# Patient Record
Sex: FEMALE | Race: WHITE | NOT HISPANIC OR LATINO | Employment: FULL TIME | URBAN - METROPOLITAN AREA
[De-identification: names, ages, dates, MRNs, and addresses within clinical notes are randomized per-mention and may not be internally consistent; named-entity substitution may affect disease eponyms.]

---

## 2018-10-17 ENCOUNTER — HOSPITAL ENCOUNTER (OUTPATIENT)
Dept: LAB | Facility: MEDICAL CENTER | Age: 38
End: 2018-10-17
Attending: OBSTETRICS & GYNECOLOGY
Payer: COMMERCIAL

## 2018-10-17 LAB
ERYTHROCYTE [DISTWIDTH] IN BLOOD BY AUTOMATED COUNT: 48.7 FL (ref 35.9–50)
GLUCOSE 1H P 50 G GLC PO SERPL-MCNC: 122 MG/DL (ref 70–139)
HCT VFR BLD AUTO: 31.9 % (ref 37–47)
HGB BLD-MCNC: 10.7 G/DL (ref 12–16)
MCH RBC QN AUTO: 32.5 PG (ref 27–33)
MCHC RBC AUTO-ENTMCNC: 33.5 G/DL (ref 33.6–35)
MCV RBC AUTO: 97 FL (ref 81.4–97.8)
PLATELET # BLD AUTO: 275 K/UL (ref 164–446)
PMV BLD AUTO: 10.3 FL (ref 9–12.9)
RBC # BLD AUTO: 3.29 M/UL (ref 4.2–5.4)
WBC # BLD AUTO: 10.4 K/UL (ref 4.8–10.8)

## 2018-10-17 PROCEDURE — 85027 COMPLETE CBC AUTOMATED: CPT

## 2018-10-17 PROCEDURE — 82950 GLUCOSE TEST: CPT

## 2018-10-17 PROCEDURE — 36415 COLL VENOUS BLD VENIPUNCTURE: CPT

## 2018-12-23 ENCOUNTER — HOSPITAL ENCOUNTER (INPATIENT)
Facility: MEDICAL CENTER | Age: 38
LOS: 1 days | End: 2018-12-24
Attending: OBSTETRICS & GYNECOLOGY | Admitting: OBSTETRICS & GYNECOLOGY
Payer: COMMERCIAL

## 2018-12-23 LAB
ALBUMIN SERPL BCP-MCNC: 3.6 G/DL (ref 3.2–4.9)
ALBUMIN/GLOB SERPL: 1.2 G/DL
ALP SERPL-CCNC: 472 U/L (ref 30–99)
ALT SERPL-CCNC: 13 U/L (ref 2–50)
ANION GAP SERPL CALC-SCNC: 10 MMOL/L (ref 0–11.9)
AST SERPL-CCNC: 16 U/L (ref 12–45)
BASOPHILS # BLD AUTO: 0.3 % (ref 0–1.8)
BASOPHILS # BLD: 0.04 K/UL (ref 0–0.12)
BILIRUB SERPL-MCNC: 0.3 MG/DL (ref 0.1–1.5)
BUN SERPL-MCNC: 9 MG/DL (ref 8–22)
CALCIUM SERPL-MCNC: 9.6 MG/DL (ref 8.5–10.5)
CHLORIDE SERPL-SCNC: 106 MMOL/L (ref 96–112)
CO2 SERPL-SCNC: 20 MMOL/L (ref 20–33)
CREAT SERPL-MCNC: 0.55 MG/DL (ref 0.5–1.4)
CREAT UR-MCNC: 114.4 MG/DL
EOSINOPHIL # BLD AUTO: 0.08 K/UL (ref 0–0.51)
EOSINOPHIL NFR BLD: 0.6 % (ref 0–6.9)
ERYTHROCYTE [DISTWIDTH] IN BLOOD BY AUTOMATED COUNT: 45.3 FL (ref 35.9–50)
ERYTHROCYTE [DISTWIDTH] IN BLOOD BY AUTOMATED COUNT: 47.3 FL (ref 35.9–50)
GLOBULIN SER CALC-MCNC: 3.1 G/DL (ref 1.9–3.5)
GLUCOSE SERPL-MCNC: 91 MG/DL (ref 65–99)
HCT VFR BLD AUTO: 35.4 % (ref 37–47)
HCT VFR BLD AUTO: 38.2 % (ref 37–47)
HGB BLD-MCNC: 11.9 G/DL (ref 12–16)
HGB BLD-MCNC: 13.2 G/DL (ref 12–16)
HOLDING TUBE BB 8507: NORMAL
IMM GRANULOCYTES # BLD AUTO: 0.12 K/UL (ref 0–0.11)
IMM GRANULOCYTES NFR BLD AUTO: 0.9 % (ref 0–0.9)
LYMPHOCYTES # BLD AUTO: 2.24 K/UL (ref 1–4.8)
LYMPHOCYTES NFR BLD: 16.3 % (ref 22–41)
MCH RBC QN AUTO: 32.4 PG (ref 27–33)
MCH RBC QN AUTO: 32.9 PG (ref 27–33)
MCHC RBC AUTO-ENTMCNC: 33.6 G/DL (ref 33.6–35)
MCHC RBC AUTO-ENTMCNC: 34.6 G/DL (ref 33.6–35)
MCV RBC AUTO: 95.3 FL (ref 81.4–97.8)
MCV RBC AUTO: 96.5 FL (ref 81.4–97.8)
MONOCYTES # BLD AUTO: 1.25 K/UL (ref 0–0.85)
MONOCYTES NFR BLD AUTO: 9.1 % (ref 0–13.4)
NEUTROPHILS # BLD AUTO: 10.04 K/UL (ref 2–7.15)
NEUTROPHILS NFR BLD: 72.8 % (ref 44–72)
NRBC # BLD AUTO: 0 K/UL
NRBC BLD-RTO: 0 /100 WBC
PLATELET # BLD AUTO: 219 K/UL (ref 164–446)
PLATELET # BLD AUTO: 263 K/UL (ref 164–446)
PMV BLD AUTO: 11.3 FL (ref 9–12.9)
PMV BLD AUTO: 11.4 FL (ref 9–12.9)
POTASSIUM SERPL-SCNC: 4 MMOL/L (ref 3.6–5.5)
PROT SERPL-MCNC: 6.7 G/DL (ref 6–8.2)
PROT UR-MCNC: 11.7 MG/DL (ref 0–15)
PROT/CREAT UR: 102 MG/G (ref 10–107)
RBC # BLD AUTO: 3.67 M/UL (ref 4.2–5.4)
RBC # BLD AUTO: 4.01 M/UL (ref 4.2–5.4)
SODIUM SERPL-SCNC: 136 MMOL/L (ref 135–145)
WBC # BLD AUTO: 13.8 K/UL (ref 4.8–10.8)
WBC # BLD AUTO: 16.4 K/UL (ref 4.8–10.8)

## 2018-12-23 PROCEDURE — 80053 COMPREHEN METABOLIC PANEL: CPT

## 2018-12-23 PROCEDURE — 82570 ASSAY OF URINE CREATININE: CPT

## 2018-12-23 PROCEDURE — 85027 COMPLETE CBC AUTOMATED: CPT

## 2018-12-23 PROCEDURE — 700111 HCHG RX REV CODE 636 W/ 250 OVERRIDE (IP): Performed by: OBSTETRICS & GYNECOLOGY

## 2018-12-23 PROCEDURE — 700105 HCHG RX REV CODE 258

## 2018-12-23 PROCEDURE — 36415 COLL VENOUS BLD VENIPUNCTURE: CPT

## 2018-12-23 PROCEDURE — 770002 HCHG ROOM/CARE - OB PRIVATE (112)

## 2018-12-23 PROCEDURE — 700105 HCHG RX REV CODE 258: Performed by: OBSTETRICS & GYNECOLOGY

## 2018-12-23 PROCEDURE — 0KQM0ZZ REPAIR PERINEUM MUSCLE, OPEN APPROACH: ICD-10-PCS | Performed by: OBSTETRICS & GYNECOLOGY

## 2018-12-23 PROCEDURE — 700112 HCHG RX REV CODE 229: Performed by: OBSTETRICS & GYNECOLOGY

## 2018-12-23 PROCEDURE — A9270 NON-COVERED ITEM OR SERVICE: HCPCS | Performed by: OBSTETRICS & GYNECOLOGY

## 2018-12-23 PROCEDURE — 700101 HCHG RX REV CODE 250

## 2018-12-23 PROCEDURE — 303615 HCHG EPIDURAL/SPINAL ANESTHESIA FOR LABOR

## 2018-12-23 PROCEDURE — 700105 HCHG RX REV CODE 258: Performed by: ANESTHESIOLOGY

## 2018-12-23 PROCEDURE — 85025 COMPLETE CBC W/AUTO DIFF WBC: CPT

## 2018-12-23 PROCEDURE — 84156 ASSAY OF PROTEIN URINE: CPT

## 2018-12-23 PROCEDURE — 59409 OBSTETRICAL CARE: CPT

## 2018-12-23 PROCEDURE — 700102 HCHG RX REV CODE 250 W/ 637 OVERRIDE(OP): Performed by: OBSTETRICS & GYNECOLOGY

## 2018-12-23 PROCEDURE — 304965 HCHG RECOVERY SERVICES

## 2018-12-23 PROCEDURE — 700111 HCHG RX REV CODE 636 W/ 250 OVERRIDE (IP)

## 2018-12-23 RX ORDER — HYDROCODONE BITARTRATE AND ACETAMINOPHEN 10; 325 MG/1; MG/1
1 TABLET ORAL EVERY 4 HOURS PRN
Status: DISCONTINUED | OUTPATIENT
Start: 2018-12-23 | End: 2018-12-24 | Stop reason: HOSPADM

## 2018-12-23 RX ORDER — SODIUM CHLORIDE, SODIUM LACTATE, POTASSIUM CHLORIDE, CALCIUM CHLORIDE 600; 310; 30; 20 MG/100ML; MG/100ML; MG/100ML; MG/100ML
INJECTION, SOLUTION INTRAVENOUS CONTINUOUS
Status: ACTIVE | OUTPATIENT
Start: 2018-12-23 | End: 2018-12-23

## 2018-12-23 RX ORDER — SODIUM CHLORIDE, SODIUM LACTATE, POTASSIUM CHLORIDE, AND CALCIUM CHLORIDE .6; .31; .03; .02 G/100ML; G/100ML; G/100ML; G/100ML
250 INJECTION, SOLUTION INTRAVENOUS PRN
Status: DISCONTINUED | OUTPATIENT
Start: 2018-12-23 | End: 2018-12-23 | Stop reason: HOSPADM

## 2018-12-23 RX ORDER — MISOPROSTOL 200 UG/1
800 TABLET ORAL
Status: DISCONTINUED | OUTPATIENT
Start: 2018-12-23 | End: 2018-12-24 | Stop reason: HOSPADM

## 2018-12-23 RX ORDER — SODIUM CHLORIDE, SODIUM LACTATE, POTASSIUM CHLORIDE, CALCIUM CHLORIDE 600; 310; 30; 20 MG/100ML; MG/100ML; MG/100ML; MG/100ML
INJECTION, SOLUTION INTRAVENOUS
Status: COMPLETED
Start: 2018-12-23 | End: 2018-12-23

## 2018-12-23 RX ORDER — SODIUM CHLORIDE, SODIUM LACTATE, POTASSIUM CHLORIDE, AND CALCIUM CHLORIDE .6; .31; .03; .02 G/100ML; G/100ML; G/100ML; G/100ML
1000 INJECTION, SOLUTION INTRAVENOUS
Status: COMPLETED | OUTPATIENT
Start: 2018-12-23 | End: 2018-12-23

## 2018-12-23 RX ORDER — ROPIVACAINE HYDROCHLORIDE 2 MG/ML
INJECTION, SOLUTION EPIDURAL; INFILTRATION; PERINEURAL CONTINUOUS
Status: DISCONTINUED | OUTPATIENT
Start: 2018-12-23 | End: 2018-12-24 | Stop reason: HOSPADM

## 2018-12-23 RX ORDER — VITAMIN A ACETATE, BETA CAROTENE, ASCORBIC ACID, CHOLECALCIFEROL, .ALPHA.-TOCOPHEROL ACETATE, DL-, THIAMINE MONONITRATE, RIBOFLAVIN, NIACINAMIDE, PYRIDOXINE HYDROCHLORIDE, FOLIC ACID, CYANOCOBALAMIN, CALCIUM CARBONATE, FERROUS FUMARATE, ZINC OXIDE, CUPRIC OXIDE 3080; 12; 120; 400; 1; 1.84; 3; 20; 22; 920; 25; 200; 27; 10; 2 [IU]/1; UG/1; MG/1; [IU]/1; MG/1; MG/1; MG/1; MG/1; MG/1; [IU]/1; MG/1; MG/1; MG/1; MG/1; MG/1
1 TABLET, FILM COATED ORAL EVERY MORNING
Status: DISCONTINUED | OUTPATIENT
Start: 2018-12-23 | End: 2018-12-24 | Stop reason: HOSPADM

## 2018-12-23 RX ORDER — SODIUM CHLORIDE, SODIUM LACTATE, POTASSIUM CHLORIDE, CALCIUM CHLORIDE 600; 310; 30; 20 MG/100ML; MG/100ML; MG/100ML; MG/100ML
INJECTION, SOLUTION INTRAVENOUS PRN
Status: DISCONTINUED | OUTPATIENT
Start: 2018-12-23 | End: 2018-12-24 | Stop reason: HOSPADM

## 2018-12-23 RX ORDER — HYDROCODONE BITARTRATE AND ACETAMINOPHEN 5; 325 MG/1; MG/1
1 TABLET ORAL EVERY 4 HOURS PRN
Status: DISCONTINUED | OUTPATIENT
Start: 2018-12-23 | End: 2018-12-24 | Stop reason: HOSPADM

## 2018-12-23 RX ORDER — DOCUSATE SODIUM 100 MG/1
100 CAPSULE, LIQUID FILLED ORAL 2 TIMES DAILY PRN
Status: DISCONTINUED | OUTPATIENT
Start: 2018-12-23 | End: 2018-12-24 | Stop reason: HOSPADM

## 2018-12-23 RX ORDER — ROPIVACAINE HYDROCHLORIDE 2 MG/ML
INJECTION, SOLUTION EPIDURAL; INFILTRATION; PERINEURAL
Status: COMPLETED
Start: 2018-12-23 | End: 2018-12-23

## 2018-12-23 RX ADMIN — HYDROCODONE BITARTRATE AND ACETAMINOPHEN 1 TABLET: 5; 325 TABLET ORAL at 20:26

## 2018-12-23 RX ADMIN — HYDROCODONE BITARTRATE AND ACETAMINOPHEN 1 TABLET: 5; 325 TABLET ORAL at 16:22

## 2018-12-23 RX ADMIN — SODIUM CHLORIDE, POTASSIUM CHLORIDE, SODIUM LACTATE AND CALCIUM CHLORIDE 1000 ML: 600; 310; 30; 20 INJECTION, SOLUTION INTRAVENOUS at 02:45

## 2018-12-23 RX ADMIN — SODIUM CHLORIDE, POTASSIUM CHLORIDE, SODIUM LACTATE AND CALCIUM CHLORIDE: 600; 310; 30; 20 INJECTION, SOLUTION INTRAVENOUS at 03:37

## 2018-12-23 RX ADMIN — SODIUM CHLORIDE, POTASSIUM CHLORIDE, SODIUM LACTATE AND CALCIUM CHLORIDE 250 ML: 600; 310; 30; 20 INJECTION, SOLUTION INTRAVENOUS at 03:29

## 2018-12-23 RX ADMIN — ROPIVACAINE HYDROCHLORIDE: 2 INJECTION, SOLUTION EPIDURAL; INFILTRATION at 03:19

## 2018-12-23 RX ADMIN — DOCUSATE SODIUM 100 MG: 100 CAPSULE, LIQUID FILLED ORAL at 20:26

## 2018-12-23 RX ADMIN — Medication 125 ML/HR: at 07:02

## 2018-12-23 RX ADMIN — Medication 2000 ML/HR: at 05:13

## 2018-12-23 RX ADMIN — SODIUM CHLORIDE, SODIUM LACTATE, POTASSIUM CHLORIDE, AND CALCIUM CHLORIDE 1000 ML: .6; .31; .03; .02 INJECTION, SOLUTION INTRAVENOUS at 02:45

## 2018-12-23 RX ADMIN — HYDROCODONE BITARTRATE AND ACETAMINOPHEN 1 TABLET: 5; 325 TABLET ORAL at 10:22

## 2018-12-23 RX ADMIN — ROPIVACAINE HYDROCHLORIDE: 2 INJECTION, SOLUTION EPIDURAL; INFILTRATION; PERINEURAL at 03:19

## 2018-12-23 ASSESSMENT — PAIN SCALES - GENERAL
PAINLEVEL_OUTOF10: 4
PAINLEVEL_OUTOF10: 5
PAINLEVEL_OUTOF10: 1
PAINLEVEL_OUTOF10: 4
PAINLEVEL_OUTOF10: 3
PAINLEVEL_OUTOF10: 1

## 2018-12-23 ASSESSMENT — PATIENT HEALTH QUESTIONNAIRE - PHQ9
2. FEELING DOWN, DEPRESSED, IRRITABLE, OR HOPELESS: NOT AT ALL
1. LITTLE INTEREST OR PLEASURE IN DOING THINGS: NOT AT ALL
SUM OF ALL RESPONSES TO PHQ9 QUESTIONS 1 AND 2: 0

## 2018-12-23 ASSESSMENT — LIFESTYLE VARIABLES
EVER_SMOKED: NEVER
ALCOHOL_USE: NO

## 2018-12-23 NOTE — PROGRESS NOTES
"  Position: SABA  Placenta: spontaneous, intact, 3VC  Lac: 2 MLL; 3-0 vicryl repair  EBL: 250cc  Complications: NC x 2 reduced on perineum  Apgars: 8 and 9  Male \"Benjamin\"  DICTATED #[id number]    awestfall  "

## 2018-12-23 NOTE — PROGRESS NOTES
Late entry     , EDC = 40w0d    Pt presents to L&D c/o painful UC q 5 minutes since . External monitors applied, /91, set to cycle. SVE 4/90/-2 (reports she was 2 cm in office on Monday). Call to Dr. Sr, directed to call [facility] resident. Spoke with Dr. Rios as MD already on L&D, will call Dr. Miles    Admission orders received    Report to CHICO Garcias RN at 0230

## 2018-12-23 NOTE — H&P
History and Physical      Dolores Evans is a 38 y.o. year old female  at 40w0d who presents with painful contracts starting around 0900 this am.  She states they have been progressively worsening throughout the day.  She denies any problems with this pregnancy and states that she initially started her prenatal care with Dr. Iyer and transferred to Dr. Sr about nursing home through her pregnancy.  She does have a prior history of a  birth at 36 weeks and a SAB.    Subjective:   positive  For CTXS.   positive Feels pain   negative for LOF  negative for vaginal bleeding.   positive for fetal movement    No LMP recorded. Patient is pregnant.  Not found.    ROS: Patient denies fever, chills, nausea, vomiting , headache, visual disturbance, or dysuria.    No past medical history on file.  No past surgical history on file.  OB History    Para Term  AB Living   2             SAB TAB Ectopic Molar Multiple Live Births                    # Outcome Date GA Lbr Corey/2nd Weight Sex Delivery Anes PTL Lv   2 Current            1  14     SAB           Social History     Social History   • Marital status:      Spouse name: N/A   • Number of children: N/A   • Years of education: N/A     Occupational History   • Not on file.     Social History Main Topics   • Smoking status: Never Smoker   • Smokeless tobacco: Not on file   • Alcohol use Not on file   • Drug use: Unknown   • Sexual activity: Not on file     Other Topics Concern   • Not on file     Social History Narrative   • No narrative on file     Allergies: Patient has no known allergies.  No current facility-administered medications for this encounter.       Objective:      There were no vitals taken for this visit.    General:   Afebrile, NAD   Skin:   No rash or lesion   HEENT:  NCAT, EOMI, non-icteric, mmm   Lungs:   CTAB   Heart:   RRR, NMGR   Abdomen:   gravid, NT   EFW:  3500h   Pelvis:  adequate with gynecoid pelvis   FHT:   140s BPM   Uterine Size: S=D   Presentations: Cephalic   Cervix:     Dilation: 4 cm    Effacement: 90%    Station:  -2    Consistency: Soft    Position: Middle     Lab Review  Lab:   Blood type: O     Recent Results (from the past 5880 hour(s))   GLUCOSE 1HR GESTATIONAL    Collection Time: 10/17/18  5:17 PM   Result Value Ref Range    Glucose, Post Dose 122 70 - 139 mg/dL   CBC WITHOUT DIFFERENTIAL    Collection Time: 10/17/18  5:17 PM   Result Value Ref Range    WBC 10.4 4.8 - 10.8 K/uL    RBC 3.29 (L) 4.20 - 5.40 M/uL    Hemoglobin 10.7 (L) 12.0 - 16.0 g/dL    Hematocrit 31.9 (L) 37.0 - 47.0 %    MCV 97.0 81.4 - 97.8 fL    MCH 32.5 27.0 - 33.0 pg    MCHC 33.5 (L) 33.6 - 35.0 g/dL    RDW 48.7 35.9 - 50.0 fL    Platelet Count 275 164 - 446 K/uL    MPV 10.3 9.0 - 12.9 fL        Assessment:   Dolores Evans 37 yo at 40w0d, PNL neg, O+, GBS neg.  Labor status: Early latent labor.  Obstetrical history significant for  birth at 36 weeks  Elevated BPs of 130s-140s/90s on admission without prior hx of HTN, gestational HTN, pre-eclampsia or eclampsia     Plan:     Admit to L&D  Continuous fetal monitoring  Continuous BP checks, CBC, CMP, urine spot protein:creatinine  Epidural as needed  Anticipate       Discussed case with Dr. Miles and agrees with plan.

## 2018-12-23 NOTE — PROGRESS NOTES
Late Entry    0230) Bedside report received from BLAINE Edwards RN, POC discussed at bedside, PIV placed by BLAINE Edwards RN, labs drawn and sent down  0243) SROM  0250) SVE - see doc flowsheets  0300) Telephone report to Dr. Miles  0306) Dr. Cooper at bedside for epidural placement  0316) Test dose administered with no adverse reaction  0329) Patient having a prolonged deceleration after epidural placement, FHT down to low 100s for approximately 4 minutes, 10L of O2 applied via face mask, IVF bolus initiated, patient repositioned until FHT back up  0344) Dr. Miles at bedside, SVE - see doc flowsheets. Report given concerning prolonged deceleration. Physician reviewed tracing at bedside  0423) SVE - see doc flowsheets, Dr. Miles called to bedside for FHT down to low 100s. FHT down even lower to 70s, Dr. Miles at bedside at 0425. FHT down for approximately 11 minutes. MD at bedside through deceleration. Patient set up for delivery. 10L of O2 on via face mask, IVF bolus infusing, patient left wedged uintil FHT back up to baseline at 0434.  0509)  viable male infant, APGARS 8/9  0513) Spontaneous delivery of intact placenta  0700) Bedside report to MAUREEN Fuentes RN, POC discussed at bedside, safety precautions remain in place

## 2018-12-23 NOTE — PROGRESS NOTES
Patient brought from labor and delivery via wheelchair.  Patient oriented to room and surroundings. Id bands and security issues discussed. Including not letting anyone take infant without pink photo id. No sleeping with infant, no carrying infant in halls, and no leaving infant unattended. 0-10 pain scale and pain management discussed. Fundus firm with light lochia. IV infusing without redness or swelling.  Family at bedside.  Patient encouraged to call before getting out of bed until stable.  Patient encouraged to call for any needs. Pt offered pain medications but refused at this time

## 2018-12-23 NOTE — L&D DELIVERY NOTE
DATE OF SERVICE:  2018    DELIVERING PHYSICIAN:  Camila Miles MD    PRIMARY OBSTETRICIAN:  Keshia Sr MD    BRIEF HISTORY:  This is a 38-year-old female  3, para 1, at 40 weeks'   gestation with prenatal care initially with through the care of Dr. Abhishek Iyer, followed by transfer mid-pregnancy to Dr. Keshia Sr, uncomplicated    course, presents with contractions.  Cervix is 4-5 cm on admission.    She is admitted.  She had spontaneous rupture of membranes of clear fluid.    Epidural was placed for labor pain.  She progressed well, pushed for   approximately 45 minutes and proceeded with delivery.  Fetal heart tracing is   reactive, reassuring, category 1 throughout the labor process.  In the second   stage of labor, she had variable decelerations with pushing with good return   to baseline and maintained normal variability and accelerations.  Group B   strep culture is negative.  She is Rh positive, rubella immune.  She did have   mildly elevated blood pressures on admission, which stabilized into normal   range after epidural was placed, CBC and CMP were normal.    DELIVERY NOTE:  Delivery is normal spontaneous vaginal delivery.  Fetal   position, left occiput anterior.    COMPLICATIONS:  Nuchal cord x2 reduced on the perineum.    LACERATIONS:  She has second-degree midline laceration, repaired postdelivery   with 3-0 Vicryl suture.    ESTIMATED BLOOD LOSS:  250 mL.    PLACENTA:  Delivered spontaneously, carefully inspected and found to be intact   with 3-vessel cord.    INFANT:  Male, Apgars 8 and 9 at one and five minutes, respectively.  Weight   is pending at this time.  His name is Carmine.    NARRATIVE OF DELIVERY:  I was called.  Patient complete +2 station, having   variable decelerations with contractions, placed in dorsal lithotomy position,   prepped appropriately.  Began pushing and the ensuing contractions, delivered   a fetal vertex, left occiput anterior  presentation.  Nose and mouth suctioned   with bulb suction of clear fluid.  Nuchal cord x2 is discovered and reduced   over the fetal vertex and maternal expulsive efforts easily delivered the   anterior shoulder without traction, followed by the posterior shoulder and the   remainder of the infant.  A pink vigorous crying infant handed directly to   mother's chest.  Approximately 2 minutes after delivery, the cord was doubly   clamped and was cut by the father of the baby.  Placenta delivered   spontaneously at this time, carefully inspected and found to be intact with   3-vessel cord.  IV Pitocin had been started.  Vigorous bimanual massage,   fundus firms up well, moderate postpartum bleeding.  Vagina and perineum were   carefully inspected.  There was a very small second-degree perineal   laceration.  Therefore, it was repaired in normal running fashion of 3-0   Vicryl suture.  At this time, the repair was intact.  She was hemostatic.  All   instruments and sponges removed and the procedure was ended.  Both mother and   baby tolerated delivery well and are stable and bonding postpartum.       ____________________________________     MD ERICKA BYERS / CONNOR    DD:  12/23/2018 05:32:48  DT:  12/23/2018 08:34:38    D#:  [id number]  Job#:  [id number]    cc: Keshia Sr MD

## 2018-12-23 NOTE — PROGRESS NOTES
Patient just received epidural  Had SROM of clear fluid @ 0245  Cx 9/100/0  CTXs q 2 min  FHTS reactive, reassuring, no repetitive decels  BPs initially 140s/90s; now 119/72  PIH labs reviewed and WNL (no urine results yet)  Will follow    awestfall

## 2018-12-24 VITALS
DIASTOLIC BLOOD PRESSURE: 85 MMHG | SYSTOLIC BLOOD PRESSURE: 115 MMHG | WEIGHT: 145 LBS | RESPIRATION RATE: 18 BRPM | HEIGHT: 66 IN | TEMPERATURE: 98.7 F | HEART RATE: 89 BPM | OXYGEN SATURATION: 97 % | BODY MASS INDEX: 23.3 KG/M2

## 2018-12-24 PROCEDURE — 700102 HCHG RX REV CODE 250 W/ 637 OVERRIDE(OP): Performed by: OBSTETRICS & GYNECOLOGY

## 2018-12-24 PROCEDURE — A9270 NON-COVERED ITEM OR SERVICE: HCPCS | Performed by: OBSTETRICS & GYNECOLOGY

## 2018-12-24 RX ORDER — HYDROCODONE BITARTRATE AND ACETAMINOPHEN 5; 325 MG/1; MG/1
1 TABLET ORAL EVERY 4 HOURS PRN
Qty: 10 TAB | Refills: 0 | Status: SHIPPED | OUTPATIENT
Start: 2018-12-24 | End: 2018-12-27

## 2018-12-24 RX ADMIN — HYDROCODONE BITARTRATE AND ACETAMINOPHEN 1 TABLET: 5; 325 TABLET ORAL at 06:43

## 2018-12-24 RX ADMIN — HYDROCODONE BITARTRATE AND ACETAMINOPHEN 1 TABLET: 5; 325 TABLET ORAL at 02:42

## 2018-12-24 ASSESSMENT — PAIN SCALES - GENERAL
PAINLEVEL_OUTOF10: 5
PAINLEVEL_OUTOF10: 3
PAINLEVEL_OUTOF10: 2
PAINLEVEL_OUTOF10: 3
PAINLEVEL_OUTOF10: 3

## 2018-12-24 ASSESSMENT — EDINBURGH POSTNATAL DEPRESSION SCALE (EPDS)
I HAVE FELT SAD OR MISERABLE: NO, NOT AT ALL
I HAVE BLAMED MYSELF UNNECESSARILY WHEN THINGS WENT WRONG: YES, SOME OF THE TIME
I HAVE BEEN ABLE TO LAUGH AND SEE THE FUNNY SIDE OF THINGS: AS MUCH AS I ALWAYS COULD
I HAVE BEEN SO UNHAPPY THAT I HAVE HAD DIFFICULTY SLEEPING: NOT VERY OFTEN
THINGS HAVE BEEN GETTING ON TOP OF ME: NO, MOST OF THE TIME I HAVE COPED QUITE WELL
THE THOUGHT OF HARMING MYSELF HAS OCCURRED TO ME: NEVER
I HAVE BEEN SO UNHAPPY THAT I HAVE BEEN CRYING: NO, NEVER
I HAVE LOOKED FORWARD WITH ENJOYMENT TO THINGS: AS MUCH AS I EVER DID
I HAVE BEEN ANXIOUS OR WORRIED FOR NO GOOD REASON: HARDLY EVER
I HAVE FELT SCARED OR PANICKY FOR NO GOOD REASON: NO, NOT AT ALL

## 2018-12-24 NOTE — PROGRESS NOTES
0700-Report received at bedside from Shelia MERIDA, assumed care of patient.  Encouraged to call with needs, denies at this time.   0800-Assessment completed, fundus is firm, lochia light and vital signs within defined parameters. Patient is ambulating and voiding without difficulty. Bonding well with infant, breastfeeding independently.  Discussed with patient pain medication plan, patient requesting to be medicated as needed, will call for medication.   Plan of care discussed, patient verbalized understanding. Hourly rounding implemented, call light within reach.

## 2018-12-24 NOTE — DISCHARGE INSTRUCTIONS
POSTPARTUM DISCHARGE INSTRUCTIONS FOR MOM    YOB: 1980   Age: 38 y.o.               Admit Date: 12/23/2018     Discharge Date: 12/24/2018  Attending Doctor:  Keshia Sr M.D.                  Allergies:  Tomato    Discharged to home by car. Discharged via wheelchair, hospital escort: Yes.  Special equipment needed: Not Applicable  Belongings with: Personal  Be sure to schedule a follow-up appointment with your primary care doctor or any specialists as instructed.     Discharge Plan:   Diet Plan: Discussed  Activity Level: Discussed  Confirmed Follow up Appointment: Appointment Scheduled  Confirmed Symptoms Management: Discussed  Medication Reconciliation Updated: Yes  Influenza Vaccine Indication: Not indicated: Previously immunized this influenza season and > 8 years of age    REASONS TO CALL YOUR OBSTETRICIAN:  1.   Persistent fever or shaking chills (Temperature higher than 100.4)  2.   Heavy bleeding (soaking more than 1 pad per hour); Passing clots  3.   Foul odor from vagina  4.   Mastitis (Breast infection; breast pain, chills, fever, redness)  5.   Urinary pain, burning or frequency  6.   Episiotomy infection  7.   Abdominal incision infection  8.   Severe depression longer than 24 hours    HAND WASHING  · Prior to handling the baby.  · Before breastfeeding or bottle feeding baby.  · After using the bathroom or changing the baby's diaper.    VAGINAL CARE  · Nothing inside vagina for 6 weeks: no sexual intercourse, tampons or douching.  · Bleeding may continue for 2-4 weeks.  Amount may vary.    · Call your physician for heavy bleeding which means soaking more than 1 pad per hour    BIRTH CONTROL  · It is possible to become pregnant at any time after delivery and while breastfeeding.  · Plan to discuss a method of birth control with your physician at your follow up visit. visit.    DIET AND ELIMINATION  · Eating more fiber (bran cereal, fruits, and vegetables) and drinking plenty of fluids  "will help to avoid constipation.  · Urinary frequency after childbirth is normal.    POSTPARTUM BLUES  During the first few days after birth, you may experience a sense of the \"blues\" which may include impatience, irritability or even crying.  These feeling come and go quickly.  However, as many as 1 in 10 women experience emotional symptoms known as postpartum depression.    Postpartum depression:  May start as early as the second or third day after delivery or take several weeks or months to develop.  Symptoms of \"blues\" are present, but are more intense:  Crying spells; loss of appetite; feelings of hopelessness or loss of control; fear of touching the baby; over concern or no concern at all about the baby; little or no concern about your own appearance/caring for yourself; and/or inability to sleep or excessive sleeping.  Contact your physician if you are experiencing any of these symptoms.    Crisis Hotline:  · [place] Crisis Hotline:  5-869-JNFJMFJ  Or 1-435.478.5646  · [place] Crisis Hotline:  1-355.230.8028  Or 450-103-2632    PREVENTING SHAKEN BABY:  If you are angry or stressed, PUT THE BABY IN THE CRIB, step away, take some deep breaths, and wait until you are calm to care for the baby.  DO NOT SHAKE THE BABY.  You are not alone, call a supporter for help.    · Crisis Call Center 24/7 crisis line 449-494-1674 or 1-319.846.8208  · You can also text them, text \"ANSWER\" to 556995    QUIT SMOKING/TOBACCO USE:  I understand the use of any tobacco products increases my chance of suffering from future heart disease and could cause other illnesses which may shorten my life. Quitting the use of tobacco products is the single most important thing I can do to improve my health. For further information on smoking / tobacco cessation call a Toll Free Quit Line at 1-508.785.7063 (*[employer] [place]) or 1-907.360.2774 (American Lung Association) or you can access the web based program at " www.lungusa.org.    · [place] Tobacco Users Help Line:  (390) 179-8344       Toll Free: 1-508.600.8599  · Quit Tobacco Program [facility] (965)943-8631    DEPRESSION / SUICIDE RISK:  As you are discharged from this [facility], it is important to learn how to keep safe from harming yourself.    Recognize the warning signs:  · Abrupt changes in personality, positive or negative- including increase in energy   · Giving away possessions  · Change in eating patterns- significant weight changes-  positive or negative  · Change in sleeping patterns- unable to sleep or sleeping all the time   · Unwillingness or inability to communicate  · Depression  · Unusual sadness, discouragement and loneliness  · Talk of wanting to die  · Neglect of personal appearance   · Rebelliousness- reckless behavior  · Withdrawal from people/activities they love  · Confusion- inability to concentrate     If you or a loved one observes any of these behaviors or has concerns about self-harm, here's what you can do:  · Talk about it- your feelings and reasons for harming yourself  · Remove any means that you might use to hurt yourself (examples: pills, rope, extension cords, firearm)  · Get professional help from the community (Mental Health, Substance Abuse, psychological counseling)  · Do not be alone:Call your Safe Contact- someone whom you trust who will be there for you.  · Call your local CRISIS HOTLINE 097-4496 or 088-206-6737  · Call your local Children's Mobile Crisis Response Team [place] (205) 661-9132 or www.[employer]  · Call the toll free National Suicide Prevention Hotlines   · National Suicide Prevention Lifeline 145-372-TPRO (3963)  · National Hope Line Network 800-SUICIDE (860-6350)    DISCHARGE SURVEY:  Thank you for choosing [facility].  We hope we provided you with very good care.  You may be receiving a survey in the mail.  Please fill it out.  Your opinion is valuable to  us.    ADDITIONAL EDUCATIONAL MATERIALS GIVEN TO PATIENT:        My signature on this form indicates that:  1.  I have reviewed and understand the above information  2.  My questions regarding this information have been answered to my satisfaction.  3.  I have formulated a plan with my discharge nurse to obtain my prescribed medication for home.

## 2018-12-24 NOTE — PROGRESS NOTES
Reviewed discharge paperwork with parents, parents verbalized understanding of follow up appointments and discharge education. Escorted out to car by transport, accompanied by FOB and infant.

## 2018-12-24 NOTE — PROGRESS NOTES
PPD # 1 S/P     Doing well.  Voiding, ambulating, breast feeding.  Denies heavy bleeding. Reports moderate cramping with breast feeding requiring norco and ibuprofen    AF, VS wnl  AAO, NAD  Abd: S/NT/ND, fundus firm  Calves: NT, no edema    Hct 35  Rh positive    Plan:  Requests discharge home.  Home today.  Instructions reviewed.  F/u 6 weeks.  Rx for motrin and norco.  Pelvic rest 6 weeks. Reviewed narcotic consents and history reviewed. No risk factors identified.

## 2018-12-24 NOTE — PROGRESS NOTES
1900: Bedside report received, assumed care of pt.     Assessment complete, VSS, fundus firm, lochia scant. Pt voiding without difficulty. Bonding well with infant, breastfeeding with quality latch, no pain with latching per pt, no breakdown noted to nipples. Pt would like to receive pain medication on a scheduled basis. POC discussed with pt and family, questions answered, call light within reach.

## 2018-12-24 NOTE — LACTATION NOTE
This note was copied from a baby's chart.  Met with mother before discharge. Term baby, 2nd child. Mother nursed her 1st baby for 25 months. Observed a feeding. Mother tried to latch baby in cradle hold, hunched over child. She had been complaining of somewhat sore nipples. Baby observed to be tongue sucking onto the nipple. Showed mother what was happening and how to correct it for more comfort. Showed her how to do cross-cradle hold with pillow support and how to wait for a wide open mouth and how to stop baby from tongue sucking her nipple into his mouth. She could feel the difference in her comfort with new position. Gave her OP lactation resources for home. She has [employer] insurance and will get a home pump if she goes back to work.

## 2018-12-24 NOTE — PROGRESS NOTES
Patient aware birth certificate is not complete and signed, patient refusing to stay to sign. Birth certificate aware and will call patient to come back later for completion.